# Patient Record
Sex: MALE | Race: WHITE
[De-identification: names, ages, dates, MRNs, and addresses within clinical notes are randomized per-mention and may not be internally consistent; named-entity substitution may affect disease eponyms.]

---

## 2023-08-15 ENCOUNTER — HOSPITAL ENCOUNTER (OUTPATIENT)
Dept: HOSPITAL 47 - RADUSWWP | Age: 68
Discharge: HOME | End: 2023-08-15
Attending: UROLOGY
Payer: MEDICARE

## 2023-08-15 DIAGNOSIS — N43.3: Primary | ICD-10-CM

## 2023-08-15 DIAGNOSIS — R60.0: ICD-10-CM

## 2023-08-15 PROCEDURE — 93975 VASCULAR STUDY: CPT

## 2023-08-15 PROCEDURE — 76870 US EXAM SCROTUM: CPT

## 2023-08-15 NOTE — US
EXAMINATION TYPE: US scrotum with doppler.  Grayscale and color Doppler Duplex imaging performed of jaylyn page scrotum.

 

DATE OF EXAM: 8/15/2023

 

COMPARISON: NONE

 

CLINICAL INDICATION: Male, 68 years old with history of N43.3 HYDROCELE, UNSPECIFIED; edema x 1 year

 

EXAM MEASUREMENTS:

 

TESTICLES:

Right Testicle:  3.5 x 3.2 x 3.5  cm . Incidental Rete testis seen 1.9 x 1.4 x 1.3 cm soft tissue mile
ma noted within scrotal sac. 

Left Testicle:  4.6 x 2.6 x 3.6 cm . Incidental Rete testis seen 2.0 x 1.4 x 1.3 cm

 

EPIDIDYMIS HEAD:

Right Epididymis:  1.2 x .8 x .9  cm

Left Epididymis:  1.8 x .9 x 1.3  cm  

 

Doppler performed to assess for testicular vascularity; good bilateral color flow and waveforms are s
een.   There is no evidence of testicular torsion.

 

Presence of hydroceles:  yes

Presence of varicoceles:  no

 

 

IMPRESSION: 

1. No evidence for testicular torsion or intratesticular mass.

2. Small bilateral hydroceles.

3. Soft tissue edema of the scrotal sac. yes